# Patient Record
Sex: FEMALE | Race: WHITE | Employment: FULL TIME | URBAN - METROPOLITAN AREA
[De-identification: names, ages, dates, MRNs, and addresses within clinical notes are randomized per-mention and may not be internally consistent; named-entity substitution may affect disease eponyms.]

---

## 2017-01-04 RX ORDER — VENLAFAXINE HYDROCHLORIDE 150 MG/1
CAPSULE, EXTENDED RELEASE ORAL
Qty: 30 CAP | Refills: 0 | Status: SHIPPED | OUTPATIENT
Start: 2017-01-04 | End: 2017-02-17 | Stop reason: SDUPTHER

## 2017-02-17 RX ORDER — VENLAFAXINE HYDROCHLORIDE 150 MG/1
CAPSULE, EXTENDED RELEASE ORAL
Qty: 30 CAP | Refills: 0 | Status: SHIPPED | OUTPATIENT
Start: 2017-02-17 | End: 2019-11-14

## 2018-05-21 RX ORDER — ALBUTEROL SULFATE 90 UG/1
2 AEROSOL, METERED RESPIRATORY (INHALATION)
Qty: 1 INHALER | Refills: 0 | Status: SHIPPED | OUTPATIENT
Start: 2018-05-21 | End: 2021-05-02 | Stop reason: SDUPTHER

## 2018-05-21 NOTE — TELEPHONE ENCOUNTER
From: Vinh Bui  To: Harper Dill MD  Sent: 5/20/2018 10:14 AM EDT  Subject: Medication Renewal Request    Original authorizing provider: MD Preeti Mirza COLBYAna Alicia Orantes would like a refill of the following medications:  albuterol (PROVENTIL HFA, VENTOLIN HFA, PROAIR HFA) 90 mcg/actuation inhaler Harper Dill MD]    Preferred pharmacy: [facility]    Comment:  My allergies are really acting up and I would love to have this inhaler refilled, please! Thanks!

## 2018-05-21 NOTE — TELEPHONE ENCOUNTER
Orders Placed This Encounter    albuterol (PROVENTIL HFA, VENTOLIN HFA, PROAIR HFA) 90 mcg/actuation inhaler     Sig: Take 2 Puffs by inhalation every four (4) hours as needed for Wheezing or Shortness of Breath. Dispense:  1 Inhaler     Refill:  0     The above medication refills were approved via verbal order by Dr. Bradley Zhu III.

## 2019-11-14 ENCOUNTER — OFFICE VISIT (OUTPATIENT)
Dept: INTERNAL MEDICINE CLINIC | Age: 64
End: 2019-11-14

## 2019-11-14 VITALS
DIASTOLIC BLOOD PRESSURE: 84 MMHG | SYSTOLIC BLOOD PRESSURE: 134 MMHG | OXYGEN SATURATION: 98 % | HEART RATE: 79 BPM | TEMPERATURE: 97.8 F | RESPIRATION RATE: 20 BRPM | HEIGHT: 61 IN

## 2019-11-14 DIAGNOSIS — J32.4 CHRONIC PANSINUSITIS: Primary | ICD-10-CM

## 2019-11-14 DIAGNOSIS — J01.90 ACUTE BACTERIAL SINUSITIS: ICD-10-CM

## 2019-11-14 DIAGNOSIS — B96.89 ACUTE BACTERIAL SINUSITIS: ICD-10-CM

## 2019-11-14 RX ORDER — PREDNISONE 10 MG/1
TABLET ORAL
Qty: 30 TAB | Refills: 0 | Status: SHIPPED | OUTPATIENT
Start: 2019-11-14 | End: 2019-11-26

## 2019-11-14 RX ORDER — CEFUROXIME AXETIL 250 MG/1
250 TABLET ORAL 2 TIMES DAILY
Qty: 28 TAB | Refills: 0 | Status: SHIPPED | OUTPATIENT
Start: 2019-11-14 | End: 2019-11-28

## 2019-11-14 NOTE — PROGRESS NOTES
HISTORY OF PRESENT ILLNESS  Preeti Hardin is a 60 y.o. female. Sinus Infection    The history is provided by the patient. This is a chronic problem. Episode onset: flares periodically, current flare up over last several days. The problem has been gradually worsening. There has been no fever. The pain is moderate. The pain has been fluctuating since onset. Associated symptoms include congestion, sinus pressure and headaches. Pertinent negatives include no chills, no sweats and no cough. Treatments tried: mucinex D, zyrtec, flonase spray. The treatment provided mild relief. No past medical history on file. - no new problems reported    Past Surgical History:   Procedure Laterality Date    HX DILATION AND CURETTAGE      Several times for infertility    HX GYN      HX TONSILLECTOMY       Allergies   Allergen Reactions    Keflex [Cephalexin] Itching      - reviewed, itching only     Family History   Problem Relation Age of Onset    Heart Disease Mother     Lung Disease Mother         COPD    Heart Disease Father      Social History     Socioeconomic History    Marital status:      Spouse name: Not on file    Number of children: Not on file    Years of education: Not on file    Highest education level: Not on file   Occupational History    Not on file   Social Needs    Financial resource strain: Not on file    Food insecurity:     Worry: Not on file     Inability: Not on file    Transportation needs:     Medical: Not on file     Non-medical: Not on file   Tobacco Use    Smoking status: Never Smoker    Smokeless tobacco: Never Used   Substance and Sexual Activity    Alcohol use:  Yes     Alcohol/week: 1.7 standard drinks     Types: 2 drink(s) per week    Drug use: No    Sexual activity: Not on file   Lifestyle    Physical activity:     Days per week: Not on file     Minutes per session: Not on file    Stress: Not on file   Relationships    Social connections:     Talks on phone: Not on file     Gets together: Not on file     Attends Rastafarian service: Not on file     Active member of club or organization: Not on file     Attends meetings of clubs or organizations: Not on file     Relationship status: Not on file    Intimate partner violence:     Fear of current or ex partner: Not on file     Emotionally abused: Not on file     Physically abused: Not on file     Forced sexual activity: Not on file   Other Topics Concern    Not on file   Social History Narrative    Not on file         Review of Systems   Constitutional: Negative for chills. HENT: Positive for congestion, nosebleeds and sinus pressure. Respiratory: Negative for cough. Gastrointestinal: Negative for abdominal pain, diarrhea, nausea and vomiting. Neurological: Positive for headaches. All other systems reviewed and are negative. Physical Exam   Constitutional: No distress. HENT:   Right Ear: Tympanic membrane and ear canal normal.   Left Ear: Tympanic membrane and ear canal normal.   Nose: Right sinus exhibits no maxillary sinus tenderness and no frontal sinus tenderness. Left sinus exhibits maxillary sinus tenderness and frontal sinus tenderness. Mouth/Throat: Posterior oropharyngeal edema and posterior oropharyngeal erythema present. No oropharyngeal exudate. Eyes: Conjunctivae are normal. Right eye exhibits no discharge. Left eye exhibits no discharge. Neck: Neck supple. Cardiovascular: Normal rate and regular rhythm. Exam reveals no gallop and no friction rub. No murmur heard. Pulmonary/Chest: Effort normal and breath sounds normal.   Lymphadenopathy:     She has no cervical adenopathy. Neurological: She is alert. Skin: Skin is warm and dry. Psychiatric: She has a normal mood and affect. Her behavior is normal.   Nursing note and vitals reviewed. ASSESSMENT and PLAN  Diagnoses and all orders for this visit:    1. Chronic pansinusitis  -     cefUROXime (CEFTIN) 250 mg tablet;  Take 1 Tab by mouth two (2) times a day for 14 days. Indications: Acute Inflammation of the Maxillary Sinus  -     predniSONE (DELTASONE) 10 mg tablet; 4 tabs x 3 days, 3 tabs x 3 days, 2 tabs x 3 days, 1 tab x 3 days  -     REFERRAL TO ENT-OTOLARYNGOLOGY    2. Acute bacterial sinusitis  -     cefUROXime (CEFTIN) 250 mg tablet; Take 1 Tab by mouth two (2) times a day for 14 days.  Indications: Acute Inflammation of the Maxillary Sinus  -     predniSONE (DELTASONE) 10 mg tablet; 4 tabs x 3 days, 3 tabs x 3 days, 2 tabs x 3 days, 1 tab x 3 days  -     REFERRAL TO ENT-OTOLARYNGOLOGY

## 2019-11-14 NOTE — PROGRESS NOTES
Hx of sinus infection x1 year intermittently - per pt self dx. Has tried multiple otc meds with little relief.

## 2020-02-05 ENCOUNTER — OFFICE VISIT (OUTPATIENT)
Dept: INTERNAL MEDICINE CLINIC | Age: 65
End: 2020-02-05

## 2020-02-05 VITALS
HEART RATE: 80 BPM | HEIGHT: 61 IN | DIASTOLIC BLOOD PRESSURE: 80 MMHG | SYSTOLIC BLOOD PRESSURE: 126 MMHG | TEMPERATURE: 97.9 F | OXYGEN SATURATION: 97 % | RESPIRATION RATE: 14 BRPM

## 2020-02-05 DIAGNOSIS — Z00.00 ROUTINE GENERAL MEDICAL EXAMINATION AT A HEALTH CARE FACILITY: Primary | ICD-10-CM

## 2020-02-05 RX ORDER — MINERAL OIL
ENEMA (ML) RECTAL
COMMUNITY

## 2020-02-05 RX ORDER — SUMATRIPTAN 50 MG/1
TABLET, FILM COATED ORAL
COMMUNITY
Start: 2020-01-30 | End: 2020-05-15 | Stop reason: SDUPTHER

## 2020-02-05 RX ORDER — TOPIRAMATE 100 MG/1
TABLET, FILM COATED ORAL
COMMUNITY
Start: 2020-01-30 | End: 2020-05-08 | Stop reason: SDUPTHER

## 2020-02-05 RX ORDER — FLUTICASONE PROPIONATE 50 MCG
2 SPRAY, SUSPENSION (ML) NASAL DAILY
COMMUNITY

## 2020-02-05 NOTE — PROGRESS NOTES
Subjective:   63 y.o. female for Well Woman Check. Her gyne and breast care is done elsewhere by her Ob-Gyne physician. There are no active problems to display for this patient. Current Outpatient Medications   Medication Sig Dispense Refill    SUMAtriptan (IMITREX) 50 mg tablet TAKE 1 TABLET BY MOUTH AS NEEDED FOR MIGRAINE HEADACHE (TAKE IMMEDIATELY AT ONSET AND MAY REPEAT ONCE IN 2 HRS IF NEEDED)      topiramate (TOPAMAX) 100 mg tablet TAKE 1 TABLET BY MOUTH ONCE DAILY AT BEDTIME OR AS DIRECTED      fexofenadine (ALLEGRA) 180 mg tablet Take  by mouth.  fluticasone propionate (FLONASE ALLERGY RELIEF) 50 mcg/actuation nasal spray 2 Sprays by Both Nostrils route daily.  guaifenesin (MUCUS RELIEF PO) Take  by mouth.  varicella-zoster recombinant, PF, (SHINGRIX) 50 mcg/0.5 mL susr injection 0.5 mL by IntraMUSCular route once for 1 dose. 0.5 mL 1    albuterol (PROVENTIL HFA, VENTOLIN HFA, PROAIR HFA) 90 mcg/actuation inhaler Take 2 Puffs by inhalation every four (4) hours as needed for Wheezing or Shortness of Breath. 1 Inhaler 0     Allergies   Allergen Reactions    Keflex [Cephalexin] Itching     Pt denies this allergy - has taken w/ no problem. No past medical history on file. Past Surgical History:   Procedure Laterality Date    HX DILATION AND CURETTAGE      Several times for infertility    HX GYN      HX TONSILLECTOMY       Family History   Problem Relation Age of Onset    Heart Disease Mother     Lung Disease Mother         COPD    Alcohol abuse Mother     Heart Disease Father      Social History     Tobacco Use    Smoking status: Never Smoker    Smokeless tobacco: Never Used   Substance Use Topics    Alcohol use:  Yes     Alcohol/week: 1.7 standard drinks     Types: 2 Standard drinks or equivalent per week        Lab Results   Component Value Date/Time    WBC 6.6 06/16/2015 10:46 AM    HGB 12.9 06/16/2015 10:46 AM    HCT 39.8 06/16/2015 10:46 AM    PLATELET 916 06/16/2015 10:46 AM    MCV 87 06/16/2015 10:46 AM     Lab Results   Component Value Date/Time    Cholesterol, total 234 (H) 06/16/2015 10:46 AM    HDL Cholesterol 64 06/16/2015 10:46 AM    LDL, calculated 147 (H) 06/16/2015 10:46 AM    Triglyceride 113 06/16/2015 10:46 AM     Lab Results   Component Value Date/Time    ALT (SGPT) 16 06/16/2015 10:46 AM    AST (SGOT) 13 06/16/2015 10:46 AM    Alk. phosphatase 79 06/16/2015 10:46 AM    Bilirubin, total 0.4 06/16/2015 10:46 AM    Albumin 4.2 06/16/2015 10:46 AM    Protein, total 6.3 06/16/2015 10:46 AM    PLATELET 252 65/42/1124 10:46 AM     Lab Results   Component Value Date/Time    GFR est non-AA 84 06/16/2015 10:46 AM    GFR est AA 97 06/16/2015 10:46 AM    Creatinine 0.78 06/16/2015 10:46 AM    BUN 15 06/16/2015 10:46 AM    Sodium 141 06/16/2015 10:46 AM    Potassium 4.5 06/16/2015 10:46 AM    Chloride 103 06/16/2015 10:46 AM    CO2 19 06/16/2015 10:46 AM     Lab Results   Component Value Date/Time    TSH 0.042 (L) 06/16/2015 10:46 AM    T4, Free 1.02 06/16/2015 10:46 AM      Lab Results   Component Value Date/Time    Glucose 84 06/16/2015 10:46 AM         Specific concerns today: Continues to struggle with her weight. She has been trying to increase her exercise. She has not been watching her diet. She has recently gotten her colonoscopy done. She is also done her mammogram and. .  Recently diagnosed with migraine headaches. Topamax and Imitrex do seem to be helping. Review of Systems  A comprehensive review of systems was negative. Objective:   Blood pressure 126/80, pulse 80, temperature 97.9 °F (36.6 °C), temperature source Oral, resp. rate 14, height 5' 1\" (1.549 m), SpO2 97 %.    Physical Examination:   General appearance - alert, well appearing, and in no distress  Eyes - pupils equal and reactive, extraocular eye movements intact  Ears - bilateral TM's and external ear canals normal  Nose - normal and patent, no erythema, discharge or polyps  Mouth - mucous membranes moist, pharynx normal without lesions  Neck - supple, no significant adenopathy  Lymphatics - no palpable lymphadenopathy, no hepatosplenomegaly  Chest - clear to auscultation, no wheezes, rales or rhonchi, symmetric air entry  Heart - normal rate, regular rhythm, normal S1, S2, no murmurs, rubs, clicks or gallops  Abdomen - soft, nontender, nondistended, no masses or organomegaly  Neurological - alert, oriented, normal speech, no focal findings or movement disorder noted  Musculoskeletal - no joint tenderness, deformity or swelling  Extremities - peripheral pulses normal, no pedal edema, no clubbing or cyanosis     Assessment/Plan:     lose weight, increase physical activity, routine labs ordered  Diagnoses and all orders for this visit:    1. Routine general medical examination at a health care facility  -     METABOLIC PANEL, COMPREHENSIVE  -     CBC WITH AUTOMATED DIFF  -     LIPID PANEL  -     TSH RFX ON ABNORMAL TO FREE T4  -     VITAMIN D, 25 HYDROXY    Other orders  -     varicella-zoster recombinant, PF, (SHINGRIX) 50 mcg/0.5 mL susr injection; 0.5 mL by IntraMUSCular route once for 1 dose. 2.  Migraine headaches-continue follow-up with ENT as planned and working on adjusting medications.

## 2020-05-07 ENCOUNTER — PATIENT MESSAGE (OUTPATIENT)
Dept: INTERNAL MEDICINE CLINIC | Age: 65
End: 2020-05-07

## 2020-05-08 RX ORDER — TOPIRAMATE 100 MG/1
TABLET, FILM COATED ORAL
Qty: 90 TAB | Refills: 1 | Status: SHIPPED | OUTPATIENT
Start: 2020-05-08 | End: 2020-11-03 | Stop reason: SDUPTHER

## 2020-05-08 NOTE — TELEPHONE ENCOUNTER
From: Katelin Bui  To: Bernie Rodriguez MD  Sent: 5/7/2020 9:55 AM EDT  Subject: Non-Urgent Jose Reilly, Dr. Pamela Forde! I would like for you to take over the handling of my migraines and all that entails including my medicines. I believe Nickie Gold sent you all my records since I asked him to do so when I initially saw him in Dec. '19. When I came for my physical in Feb. of this year, we discussed all of this and you have the name and dosage of the current meds. Is there anything else I need to do about this? Thanks so much and I hope you are staying healthy!   Preeti

## 2020-05-15 RX ORDER — SUMATRIPTAN 50 MG/1
TABLET, FILM COATED ORAL
Qty: 12 TAB | Refills: 5 | Status: SHIPPED | OUTPATIENT
Start: 2020-05-15 | End: 2021-07-29

## 2020-05-15 NOTE — TELEPHONE ENCOUNTER
Requested Prescriptions     Pending Prescriptions Disp Refills    SUMAtriptan (IMITREX) 50 mg tablet       [address], [place] [place] [address]

## 2020-11-04 RX ORDER — TOPIRAMATE 100 MG/1
TABLET, FILM COATED ORAL
Qty: 90 TAB | Refills: 1 | Status: SHIPPED | OUTPATIENT
Start: 2020-11-04 | End: 2021-05-02 | Stop reason: SDUPTHER

## 2021-05-03 RX ORDER — ALBUTEROL SULFATE 90 UG/1
2 AEROSOL, METERED RESPIRATORY (INHALATION)
Qty: 1 INHALER | Refills: 0 | Status: SHIPPED | OUTPATIENT
Start: 2021-05-03 | End: 2022-07-27 | Stop reason: SDUPTHER

## 2021-05-03 RX ORDER — TOPIRAMATE 100 MG/1
TABLET, FILM COATED ORAL
Qty: 90 TAB | Refills: 1 | Status: SHIPPED | OUTPATIENT
Start: 2021-05-03 | End: 2021-10-28

## 2021-07-29 RX ORDER — SUMATRIPTAN 50 MG/1
TABLET, FILM COATED ORAL
Qty: 12 TABLET | Refills: 0 | Status: SHIPPED | OUTPATIENT
Start: 2021-07-29 | End: 2021-12-02

## 2021-10-28 RX ORDER — TOPIRAMATE 100 MG/1
TABLET, FILM COATED ORAL
Qty: 90 TABLET | Refills: 0 | Status: SHIPPED | OUTPATIENT
Start: 2021-10-28 | End: 2022-01-26

## 2021-12-02 RX ORDER — SUMATRIPTAN 50 MG/1
TABLET, FILM COATED ORAL
Qty: 12 TABLET | Refills: 0 | Status: SHIPPED | OUTPATIENT
Start: 2021-12-02 | End: 2022-07-27 | Stop reason: SDUPTHER

## 2022-01-26 RX ORDER — TOPIRAMATE 100 MG/1
TABLET, FILM COATED ORAL
Qty: 90 TABLET | Refills: 0 | Status: SHIPPED | OUTPATIENT
Start: 2022-01-26 | End: 2022-05-01

## 2022-02-18 ENCOUNTER — OFFICE VISIT (OUTPATIENT)
Dept: INTERNAL MEDICINE CLINIC | Age: 67
End: 2022-02-18
Payer: COMMERCIAL

## 2022-02-18 VITALS
HEART RATE: 87 BPM | SYSTOLIC BLOOD PRESSURE: 98 MMHG | DIASTOLIC BLOOD PRESSURE: 63 MMHG | OXYGEN SATURATION: 100 % | RESPIRATION RATE: 16 BRPM | TEMPERATURE: 98 F | HEIGHT: 62 IN

## 2022-02-18 DIAGNOSIS — Z23 ENCOUNTER FOR IMMUNIZATION: ICD-10-CM

## 2022-02-18 DIAGNOSIS — Z00.00 ROUTINE GENERAL MEDICAL EXAMINATION AT A HEALTH CARE FACILITY: Primary | ICD-10-CM

## 2022-02-18 DIAGNOSIS — Z00.00 WELL WOMAN EXAM (NO GYNECOLOGICAL EXAM): ICD-10-CM

## 2022-02-18 DIAGNOSIS — Z12.31 SCREENING MAMMOGRAM FOR BREAST CANCER: ICD-10-CM

## 2022-02-18 DIAGNOSIS — F98.8 ATTENTION DEFICIT DISORDER (ADD) WITHOUT HYPERACTIVITY: ICD-10-CM

## 2022-02-18 PROCEDURE — 99397 PER PM REEVAL EST PAT 65+ YR: CPT | Performed by: INTERNAL MEDICINE

## 2022-02-18 PROCEDURE — 90732 PPSV23 VACC 2 YRS+ SUBQ/IM: CPT | Performed by: INTERNAL MEDICINE

## 2022-02-18 PROCEDURE — 93000 ELECTROCARDIOGRAM COMPLETE: CPT | Performed by: INTERNAL MEDICINE

## 2022-02-18 PROCEDURE — 90471 IMMUNIZATION ADMIN: CPT | Performed by: INTERNAL MEDICINE

## 2022-02-18 RX ORDER — METHYLPHENIDATE HYDROCHLORIDE 10 MG/1
10 TABLET ORAL
Qty: 60 TABLET | Refills: 0 | Status: SHIPPED | OUTPATIENT
Start: 2022-02-18 | End: 2022-03-30 | Stop reason: DRUGHIGH

## 2022-02-18 RX ORDER — CETIRIZINE HCL 10 MG
10 TABLET ORAL DAILY
COMMUNITY

## 2022-02-18 NOTE — PROGRESS NOTES
Subjective:   74 y.o. female for Well Woman Check. Her gyne and breast care is done elsewhere by her Ob-Gyne physician. There are no problems to display for this patient. Current Outpatient Medications   Medication Sig Dispense Refill    cetirizine (ZYRTEC) 10 mg tablet Take 10 mg by mouth daily.  methylphenidate HCl (RITALIN) 10 mg tablet Take 1 Tablet by mouth two (2) times daily as needed (add). Max Daily Amount: 20 mg. 60 Tablet 0    topiramate (TOPAMAX) 100 mg tablet TAKE 1 TABLET BY MOUTH ONCE DAILY AT BEDTIME OR AS DIRECTED PATIENT MUST MAKE AN APPOINTMENT FOR MORE REFILLS 90 Tablet 0    SUMAtriptan (IMITREX) 50 mg tablet TAKE 1 TABLET BY MOUTH AS NEEDED FOR MIGRAINE HEADACHE (TAKE IMMEDIATELY AT ONSET AND MAY REPEAT ONCE IN 2 HOURS IF NEEDED) 12 Tablet 0    albuterol (PROVENTIL HFA, VENTOLIN HFA, PROAIR HFA) 90 mcg/actuation inhaler Take 2 Puffs by inhalation every four (4) hours as needed for Wheezing or Shortness of Breath. 1 Inhaler 0    fexofenadine (ALLEGRA) 180 mg tablet Take  by mouth.  fluticasone propionate (FLONASE ALLERGY RELIEF) 50 mcg/actuation nasal spray 2 Sprays by Both Nostrils route daily.  guaifenesin (MUCUS RELIEF PO) Take  by mouth. Allergies   Allergen Reactions    Keflex [Cephalexin] Itching     Pt denies this allergy - has taken w/ no problem. History reviewed. No pertinent past medical history.   Past Surgical History:   Procedure Laterality Date    HX DILATION AND CURETTAGE      Several times for infertility    HX GYN      HX TONSILLECTOMY       Family History   Problem Relation Age of Onset    Heart Disease Mother     Lung Disease Mother         COPD    Alcohol abuse Mother     Heart Disease Father         Lab Results   Component Value Date/Time    WBC 6.6 06/16/2015 10:46 AM    HGB 12.9 06/16/2015 10:46 AM    HCT 39.8 06/16/2015 10:46 AM    PLATELET 818 48/93/2227 10:46 AM    MCV 87 06/16/2015 10:46 AM     Lab Results   Component Value Date/Time    Cholesterol, total 234 (H) 06/16/2015 10:46 AM    HDL Cholesterol 64 06/16/2015 10:46 AM    LDL, calculated 147 (H) 06/16/2015 10:46 AM    Triglyceride 113 06/16/2015 10:46 AM     Lab Results   Component Value Date/Time    ALT (SGPT) 16 06/16/2015 10:46 AM    Alk. phosphatase 79 06/16/2015 10:46 AM    Bilirubin, total 0.4 06/16/2015 10:46 AM    Albumin 4.2 06/16/2015 10:46 AM    Protein, total 6.3 06/16/2015 10:46 AM    PLATELET 973 16/92/7670 10:46 AM     Lab Results   Component Value Date/Time    GFR est non-AA 84 06/16/2015 10:46 AM    GFR est AA 97 06/16/2015 10:46 AM    Creatinine 0.78 06/16/2015 10:46 AM    BUN 15 06/16/2015 10:46 AM    Sodium 141 06/16/2015 10:46 AM    Potassium 4.5 06/16/2015 10:46 AM    Chloride 103 06/16/2015 10:46 AM    CO2 19 06/16/2015 10:46 AM     Lab Results   Component Value Date/Time    TSH 0.042 (L) 06/16/2015 10:46 AM    T4, Free 1.02 06/16/2015 10:46 AM      Lab Results   Component Value Date/Time    Glucose 84 06/16/2015 10:46 AM         Specific concerns today: ADD issues - both kids have it and she is struggling with work. .    Review of Systems  A comprehensive review of systems was negative except for that written in the HPI. Objective:   Blood pressure 98/63, pulse 87, temperature 98 °F (36.7 °C), temperature source Temporal, resp. rate 16, height 5' 1.5\" (1.562 m), SpO2 100 %.    Physical Examination:   General appearance - alert, well appearing, and in no distress  Ears - bilateral TM's and external ear canals normal  Mouth - mucous membranes moist, pharynx normal without lesions  Neck - supple, no significant adenopathy  Lymphatics - no palpable lymphadenopathy, no hepatosplenomegaly  Chest - clear to auscultation, no wheezes, rales or rhonchi, symmetric air entry  Heart - normal rate, regular rhythm, normal S1, S2, no murmurs, rubs, clicks or gallops  Abdomen - soft, nontender, nondistended, no masses or organomegaly  Neurological - alert, oriented, normal speech, no focal findings or movement disorder noted, motor and sensory grossly normal bilaterally  Musculoskeletal - no joint tenderness, deformity or swelling  Extremities - peripheral pulses normal, no pedal edema, no clubbing or cyanosis     Assessment/Plan:     lose weight, increase physical activity, routine labs ordered  Diagnoses and all orders for this visit:    1. Routine general medical examination at a health care facility  -     AMB POC EKG ROUTINE W/ 12 LEADS, INTER & REP  -     CBC WITH AUTOMATED DIFF; Future  -     LIPID PANEL; Future  -     METABOLIC PANEL, COMPREHENSIVE; Future  -     TSH 3RD GENERATION; Future  -     VITAMIN D, 25 HYDROXY; Future    2. Screening mammogram for breast cancer  -     [facility] MAMMO BI SCREENING INCL CAD; Future    3. Encounter for immunization  -     PNEUMOCOCCAL POLYSACCHARIDE VACCINE, 23-VALENT, ADULT OR IMMUNOSUPPRESSED PT DOSE,    4. Attention deficit disorder (ADD) without hyperactivity - will treat with ADD meds and she will let me know how it goes. -     methylphenidate HCl (RITALIN) 10 mg tablet; Take 1 Tablet by mouth two (2) times daily as needed (add).  Max Daily Amount: 20 mg.    5. Well woman exam (no gynecological exam)  Comments:  [V70.0]

## 2022-02-18 NOTE — PROGRESS NOTES
Jessica Tubbs  is a 74 y.o.  female  who present for routine immunizations. Prior to vaccine administration: Consent was obtained. Risks and adverse reactions were discussed. The patient was provided the VIS and they were given an opportunity to ask questions; all questions were addressed. She  denies any symptoms, reactions or allergies that would exclude them from being immunized today. There were no adverse reactions observed post vaccination. Patient was advised to seek medical or call the office with any questions or concerns post vaccination. Patient verbalized understanding.   Bridger Grossman

## 2022-02-18 NOTE — PROGRESS NOTES
Chief Complaint   Patient presents with    Complete Physical       1. Have you been to the ER, urgent care clinic since your last visit? Hospitalized since your last visit? No    2. Have you seen or consulted any other health care providers outside of the [address] [place] [address] since your last visit? Include any pap smears or colon screening.  No

## 2022-03-28 ENCOUNTER — PATIENT MESSAGE (OUTPATIENT)
Dept: INTERNAL MEDICINE CLINIC | Age: 67
End: 2022-03-28

## 2022-03-28 DIAGNOSIS — F98.8 ATTENTION DEFICIT DISORDER (ADD) WITHOUT HYPERACTIVITY: Primary | ICD-10-CM

## 2022-03-30 RX ORDER — METHYLPHENIDATE HYDROCHLORIDE 20 MG/1
20 TABLET ORAL 2 TIMES DAILY
Qty: 60 TABLET | Refills: 0 | Status: SHIPPED | OUTPATIENT
Start: 2022-03-30 | End: 2022-04-18 | Stop reason: SDUPTHER

## 2022-04-18 DIAGNOSIS — F98.8 ATTENTION DEFICIT DISORDER (ADD) WITHOUT HYPERACTIVITY: ICD-10-CM

## 2022-04-18 RX ORDER — METHYLPHENIDATE HYDROCHLORIDE 20 MG/1
20 TABLET ORAL 2 TIMES DAILY
Qty: 60 TABLET | Refills: 0 | Status: SHIPPED | OUTPATIENT
Start: 2022-04-18 | End: 2022-07-08 | Stop reason: SDUPTHER

## 2022-05-01 RX ORDER — TOPIRAMATE 100 MG/1
TABLET, FILM COATED ORAL
Qty: 90 TABLET | Refills: 0 | Status: SHIPPED | OUTPATIENT
Start: 2022-05-01 | End: 2022-07-27 | Stop reason: SDUPTHER

## 2022-05-31 ENCOUNTER — HOSPITAL ENCOUNTER (OUTPATIENT)
Dept: MAMMOGRAPHY | Age: 67
Discharge: HOME | End: 2022-05-31
Attending: INTERNAL MEDICINE
Payer: COMMERCIAL

## 2022-05-31 DIAGNOSIS — Z12.31 SCREENING MAMMOGRAM FOR BREAST CANCER: ICD-10-CM

## 2022-05-31 PROCEDURE — 77067 SCR MAMMO BI INCL CAD: CPT

## 2022-06-01 ENCOUNTER — TELEPHONE (OUTPATIENT)
Dept: INTERNAL MEDICINE CLINIC | Age: 67
End: 2022-06-01

## 2022-06-01 NOTE — TELEPHONE ENCOUNTER
Returned call to patient. States she has cough, sore throat, and is hoarse. Asked if she could be seen in person for evaluation. Notified of current COVID protocol and need for VV. Patient states she did a VV over the weekend online and is no better. Currently taking mucinex, vit  C, robitussin, and tessalon - not feeling better. Denies fever, denies SOB. She has not been COVID tested. Reviewed with PCP and notified patient that he recommends she be seen in Urgent  Care for COVID testing, someone can listen to lungs, etc.  May need CXR. Patient voiced understanding.

## 2022-06-01 NOTE — TELEPHONE ENCOUNTER
Patient called to schedule an appointment -- has laryngitis, congestion, etc.  I told her we could schedule a virtual appt but she was not satisfied with that. Wants to \"speak with Tree or the nurse\".

## 2022-07-08 DIAGNOSIS — F98.8 ATTENTION DEFICIT DISORDER (ADD) WITHOUT HYPERACTIVITY: ICD-10-CM

## 2022-07-08 RX ORDER — METHYLPHENIDATE HYDROCHLORIDE 20 MG/1
20 TABLET ORAL 2 TIMES DAILY
Qty: 60 TABLET | Refills: 0 | Status: SHIPPED | OUTPATIENT
Start: 2022-07-08 | End: 2022-10-20 | Stop reason: SDUPTHER

## 2022-07-08 NOTE — TELEPHONE ENCOUNTER
Chief Complaint   Patient presents with    Medication Refill     Last Appointment with Dr. Rosemarie Vargas:  2/18/2022  No future appointments.

## 2022-07-27 RX ORDER — TOPIRAMATE 100 MG/1
TABLET, FILM COATED ORAL
Qty: 90 TABLET | Refills: 0 | Status: SHIPPED | OUTPATIENT
Start: 2022-07-27 | End: 2022-10-20 | Stop reason: SDUPTHER

## 2022-07-27 RX ORDER — ALBUTEROL SULFATE 90 UG/1
2 AEROSOL, METERED RESPIRATORY (INHALATION)
Qty: 1 EACH | Refills: 0 | Status: SHIPPED | OUTPATIENT
Start: 2022-07-27

## 2022-07-27 RX ORDER — SUMATRIPTAN 50 MG/1
50 TABLET, FILM COATED ORAL
Qty: 12 TABLET | Refills: 3 | Status: SHIPPED | OUTPATIENT
Start: 2022-07-27 | End: 2022-07-27

## 2022-07-27 NOTE — TELEPHONE ENCOUNTER
Chief Complaint   Patient presents with    Medication Refill     Last Appointment with Dr. Saleem :  2/18/2022  No future appointments.

## 2022-10-20 DIAGNOSIS — F98.8 ATTENTION DEFICIT DISORDER (ADD) WITHOUT HYPERACTIVITY: ICD-10-CM

## 2022-10-20 RX ORDER — TOPIRAMATE 100 MG/1
TABLET, FILM COATED ORAL
Qty: 90 TABLET | Refills: 0 | Status: SHIPPED | OUTPATIENT
Start: 2022-10-20

## 2022-10-20 RX ORDER — METHYLPHENIDATE HYDROCHLORIDE 20 MG/1
20 TABLET ORAL 2 TIMES DAILY
Qty: 60 TABLET | Refills: 0 | Status: CANCELLED | OUTPATIENT
Start: 2022-10-20

## 2022-10-20 RX ORDER — METHYLPHENIDATE HYDROCHLORIDE 20 MG/1
20 TABLET ORAL 2 TIMES DAILY
Qty: 60 TABLET | Refills: 0 | Status: SHIPPED | OUTPATIENT
Start: 2022-10-20

## 2022-10-20 NOTE — TELEPHONE ENCOUNTER
Chief Complaint   Patient presents with    Medication Refill     Last Appointment with Dr. Quinn Friend:  2/18/2022  No future appointments.

## 2023-01-26 DIAGNOSIS — F98.8 ATTENTION DEFICIT DISORDER (ADD) WITHOUT HYPERACTIVITY: ICD-10-CM

## 2023-02-02 RX ORDER — METHYLPHENIDATE HYDROCHLORIDE 20 MG/1
20 TABLET ORAL 2 TIMES DAILY
Qty: 60 TABLET | Refills: 0 | Status: SHIPPED | OUTPATIENT
Start: 2023-02-02

## 2023-02-06 RX ORDER — TOPIRAMATE 100 MG/1
TABLET, FILM COATED ORAL
Qty: 90 TABLET | Refills: 0 | Status: SHIPPED | OUTPATIENT
Start: 2023-02-06

## 2023-02-06 NOTE — TELEPHONE ENCOUNTER
Chief Complaint   Patient presents with    Medication Refill     Last Appointment with Dr. Varghese Primer:  2/18/2022  Future Appointments   Date Time Provider Brice Zheng   4/27/2023  4:00 PM Michele Funez MD [facility]

## 2023-04-27 ENCOUNTER — OFFICE VISIT (OUTPATIENT)
Dept: INTERNAL MEDICINE CLINIC | Age: 68
End: 2023-04-27
Payer: COMMERCIAL

## 2023-04-27 VITALS
OXYGEN SATURATION: 97 % | HEART RATE: 79 BPM | SYSTOLIC BLOOD PRESSURE: 119 MMHG | TEMPERATURE: 97.8 F | DIASTOLIC BLOOD PRESSURE: 83 MMHG | RESPIRATION RATE: 18 BRPM

## 2023-04-27 DIAGNOSIS — F98.8 ATTENTION DEFICIT DISORDER (ADD) WITHOUT HYPERACTIVITY: ICD-10-CM

## 2023-04-27 DIAGNOSIS — G43.809 OTHER MIGRAINE WITHOUT STATUS MIGRAINOSUS, NOT INTRACTABLE: ICD-10-CM

## 2023-04-27 DIAGNOSIS — J30.1 NON-SEASONAL ALLERGIC RHINITIS DUE TO POLLEN: ICD-10-CM

## 2023-04-27 DIAGNOSIS — Z00.00 ROUTINE GENERAL MEDICAL EXAMINATION AT A HEALTH CARE FACILITY: Primary | ICD-10-CM

## 2023-04-27 DIAGNOSIS — Z12.31 SCREENING MAMMOGRAM FOR BREAST CANCER: ICD-10-CM

## 2023-04-27 PROCEDURE — 99397 PER PM REEVAL EST PAT 65+ YR: CPT | Performed by: INTERNAL MEDICINE

## 2023-04-27 RX ORDER — SUMATRIPTAN 100 MG/1
TABLET, FILM COATED ORAL
COMMUNITY

## 2023-04-27 NOTE — PROGRESS NOTES
Chief Complaint   Patient presents with    Physical     Blood pressure 119/83, pulse 79, temperature 97.8 °F (36.6 °C), temperature source Temporal, resp. rate 18, SpO2 97 %.

## 2023-04-27 NOTE — PROGRESS NOTES
Subjective:   79 y.o. female for Well Woman Check. Her gyne and breast care is done elsewhere by her Ob-Gyne physician. There are no problems to display for this patient. Current Outpatient Medications   Medication Sig Dispense Refill    SUMAtriptan (IMITREX) 100 mg tablet sumatriptan      pneumococcal 20-brandi conj-dip, PF, (PREVNAR 20) 0.5 mL syrg injection 0.5 mL by IntraMUSCular route PRIOR TO DISCHARGE for 1 dose. 1 Each 0    topiramate (TOPAMAX) 100 mg tablet TAKE 1 TABLET BY MOUTH ONCE DAILY AT BEDTIME OR AS DIRECTED 90 Tablet 0    methylphenidate HCl (RITALIN) 20 mg tablet Take 1 Tablet by mouth two (2) times a day. Max Daily Amount: 40 mg. 60 Tablet 0    albuterol (PROVENTIL HFA, VENTOLIN HFA, PROAIR HFA) 90 mcg/actuation inhaler Take 2 Puffs by inhalation every four (4) hours as needed for Wheezing or Shortness of Breath. 1 Each 0    cetirizine (ZYRTEC) 10 mg tablet Take 1 Tablet by mouth daily. fluticasone propionate (FLONASE) 50 mcg/actuation nasal spray 2 Sprays by Both Nostrils route daily. guaifenesin (MUCUS RELIEF PO) Take  by mouth. Allergies   Allergen Reactions    Keflex [Cephalexin] Itching     Pt denies this allergy - has taken w/ no problem. History reviewed. No pertinent past medical history. Past Surgical History:   Procedure Laterality Date    HX DILATION AND CURETTAGE      Several times for infertility    HX GYN      HX TONSILLECTOMY       Family History   Problem Relation Age of Onset    Heart Disease Mother     Lung Disease Mother         COPD    Alcohol abuse Mother     Heart Disease Father     Breast Cancer Maternal Grandmother 76     Social History     Tobacco Use    Smoking status: Never    Smokeless tobacco: Never   Substance Use Topics    Alcohol use:  Yes     Alcohol/week: 1.0 standard drink     Types: 1 Standard drinks or equivalent per week        Lab Results   Component Value Date/Time    WBC 6.0 03/07/2022 08:33 AM    HGB 12.5 03/07/2022 08:33 AM HCT 39.6 03/07/2022 08:33 AM    PLATELET 647 97/32/0076 08:33 AM    MCV 89.0 03/07/2022 08:33 AM     Lab Results   Component Value Date/Time    Glucose 89 03/07/2022 08:33 AM    LDL, calculated 144.6 (H) 03/07/2022 08:33 AM    Creatinine 0.98 03/07/2022 08:33 AM      Lab Results   Component Value Date/Time    Cholesterol, total 230 (H) 03/07/2022 08:33 AM    HDL Cholesterol 63 03/07/2022 08:33 AM    LDL, calculated 144.6 (H) 03/07/2022 08:33 AM    Triglyceride 112 03/07/2022 08:33 AM    CHOL/HDL Ratio 3.7 03/07/2022 08:33 AM     Lab Results   Component Value Date/Time    ALT (SGPT) 18 03/07/2022 08:33 AM    Alk. phosphatase 72 03/07/2022 08:33 AM    Bilirubin, total 0.4 03/07/2022 08:33 AM    Albumin 3.8 03/07/2022 08:33 AM    Protein, total 6.8 03/07/2022 08:33 AM    PLATELET 116 49/39/8020 08:33 AM       Lab Results   Component Value Date/Time    GFR est non-AA 57 (L) 03/07/2022 08:33 AM    GFR est AA >60 03/07/2022 08:33 AM    Creatinine 0.98 03/07/2022 08:33 AM    BUN 16 03/07/2022 08:33 AM    Sodium 140 03/07/2022 08:33 AM    Potassium 4.0 03/07/2022 08:33 AM    Chloride 112 (H) 03/07/2022 08:33 AM    CO2 26 03/07/2022 08:33 AM     Lab Results   Component Value Date/Time    TSH 1.94 03/07/2022 08:33 AM    T4, Free 1.02 06/16/2015 10:46 AM      Lab Results   Component Value Date/Time    Glucose 89 03/07/2022 08:33 AM         Specific concerns today: Ongoing issues with allergy symptoms. She has had stuffy nose as well as some itchy eyes and irritation. Migraine headaches are reasonably controlled. Sumatriptan hand does help when she takes it and that is infrequent. .    Review of Systems  A comprehensive review of systems was negative except for that written in the HPI. Objective:   Blood pressure 119/83, pulse 79, temperature 97.8 °F (36.6 °C), temperature source Temporal, resp. rate 18, SpO2 97 %.    Physical Examination:   General appearance - alert, well appearing, and in no distress  Mental status - alert, oriented to person, place, and time  Ears - bilateral TM's and external ear canals normal  Mouth - mucous membranes moist, pharynx normal without lesions  Neck - supple, no significant adenopathy  Lymphatics - no palpable lymphadenopathy, no hepatosplenomegaly  Chest - clear to auscultation, no wheezes, rales or rhonchi, symmetric air entry  Heart - normal rate, regular rhythm, normal S1, S2, no murmurs, rubs, clicks or gallops  Abdomen - soft, nontender, nondistended, no masses or organomegaly  Neurological - alert, oriented, normal speech, no focal findings or movement disorder noted  Musculoskeletal - no joint tenderness, deformity or swelling  Extremities - peripheral pulses normal, no pedal edema, no clubbing or cyanosis     Assessment/Plan:     lose weight, increase physical activity, routine labs ordered  Diagnoses and all orders for this visit:    1. Routine general medical examination at a health care facility  -     METABOLIC PANEL, COMPREHENSIVE; Future  -     CBC WITH AUTOMATED DIFF; Future  -     LIPID PANEL; Future  -     TSH 3RD GENERATION; Future  -     VITAMIN D, 25 HYDROXY; Future    2. Attention deficit disorder (ADD) without hyperactivity-well-controlled on Ritalin. 3. Non-seasonal allergic rhinitis due to pollen-has not been using her Flonase regularly. We will restart that and continue Zyrtec and let me know if not improving. 4. Other migraine without status migrainosus, not intractable-stable on current meds and preventatives. 5. Screening mammogram for breast cancer  -     [facility] 3D MARIANO W MAMMO BI SCREENING INCL CAD; Future    Other orders  -     pneumococcal 20-brandi conj-dip, PF, (PREVNAR 20) 0.5 mL syrg injection; 0.5 mL by IntraMUSCular route PRIOR TO DISCHARGE for 1 dose.

## 2023-05-03 RX ORDER — TOPIRAMATE 100 MG/1
TABLET, FILM COATED ORAL
Qty: 90 TABLET | Refills: 0 | Status: SHIPPED | OUTPATIENT
Start: 2023-05-03

## 2023-05-17 ENCOUNTER — TRANSCRIBE ORDERS (OUTPATIENT)
Facility: HOSPITAL | Age: 68
End: 2023-05-17

## 2023-05-17 DIAGNOSIS — Z12.31 ENCOUNTER FOR MAMMOGRAM TO ESTABLISH BASELINE MAMMOGRAM: Primary | ICD-10-CM

## 2023-07-06 ENCOUNTER — PATIENT MESSAGE (OUTPATIENT)
Age: 68
End: 2023-07-06

## 2023-07-06 DIAGNOSIS — F98.8 OTHER SPECIFIED BEHAVIORAL AND EMOTIONAL DISORDERS WITH ONSET USUALLY OCCURRING IN CHILDHOOD AND ADOLESCENCE: Primary | ICD-10-CM

## 2023-07-06 RX ORDER — METHYLPHENIDATE HYDROCHLORIDE 20 MG/1
20 TABLET ORAL 2 TIMES DAILY
Qty: 60 TABLET | Refills: 0 | Status: SHIPPED | OUTPATIENT
Start: 2023-07-06 | End: 2023-08-05

## 2023-07-06 NOTE — TELEPHONE ENCOUNTER
From: Darlene Funes  To: Dr. Adama Jones: 7/6/2023 9:28 AM EDT  Subject: Ritalin    Good morning! My Chart will not let me request a refill and neither will Ignacia Finch do you mind calling it in for me? Thanks so much!   Gladys

## 2023-07-23 RX ORDER — TOPIRAMATE 100 MG/1
TABLET, FILM COATED ORAL
Qty: 90 TABLET | Refills: 0 | Status: SHIPPED | OUTPATIENT
Start: 2023-07-23

## 2023-07-24 ENCOUNTER — TELEPHONE (OUTPATIENT)
Age: 68
End: 2023-07-24

## 2023-07-24 NOTE — TELEPHONE ENCOUNTER
Medication Refill Request    Gladys Peoples is requesting a refill of the following medication(s):   topiramate (TOPAMAX) 100 MG tablet                  Please send refill to:      [address] [phone] - F [phone]  [address] [place] [address]  Phone: [phone] Fax: [phone]

## 2023-07-25 ENCOUNTER — HOSPITAL ENCOUNTER (OUTPATIENT)
Age: 68
Discharge: HOME | End: 2023-07-28
Payer: COMMERCIAL

## 2023-07-25 DIAGNOSIS — Z12.31 ENCOUNTER FOR MAMMOGRAM TO ESTABLISH BASELINE MAMMOGRAM: ICD-10-CM

## 2023-07-25 PROCEDURE — 77063 BREAST TOMOSYNTHESIS BI: CPT

## 2023-07-27 ENCOUNTER — PATIENT MESSAGE (OUTPATIENT)
Age: 68
End: 2023-07-27

## 2023-07-28 NOTE — TELEPHONE ENCOUNTER
From: Ajay Truong  To: Dr. Abdul Noss: 7/27/2023 12:55 PM EDT  Subject: Daughter Patricia [place]! Judy Dobson has had no luck getting an appointment with [facility].  What do you suggest?   Thanks,   Gladys

## 2023-08-01 ENCOUNTER — TELEPHONE (OUTPATIENT)
Age: 68
End: 2023-08-01

## 2023-08-01 RX ORDER — SUMATRIPTAN 100 MG/1
100 TABLET, FILM COATED ORAL DAILY PRN
Qty: 9 TABLET | Refills: 3 | Status: SHIPPED | OUTPATIENT
Start: 2023-08-01

## 2023-08-01 NOTE — TELEPHONE ENCOUNTER
Medication Refill Request    Gladys Sepulveda is requesting a refill of the following medication(s):   Sumatriptan 500mg  Please send refill to:      [address], [address] [place] [address] Drive [phone] - f [phone]  [address] [place] [address]  Phone: [phone] Fax: [phone]

## 2023-08-04 NOTE — TELEPHONE ENCOUNTER
Attempted to contact Gladys and [employer]. LMTCB to schedule NP appt with [facility]. Also sent [facility] with no response. Sally Abdi has scheduled a new pt appt with another practice in October and cannot get in touch with to set up with [facility].

## 2023-08-11 DIAGNOSIS — F98.8 OTHER SPECIFIED BEHAVIORAL AND EMOTIONAL DISORDERS WITH ONSET USUALLY OCCURRING IN CHILDHOOD AND ADOLESCENCE: Primary | ICD-10-CM

## 2023-08-11 RX ORDER — METHYLPHENIDATE HYDROCHLORIDE 20 MG/1
20 TABLET ORAL 2 TIMES DAILY
Qty: 60 TABLET | Refills: 0 | Status: SHIPPED | OUTPATIENT
Start: 2023-08-11 | End: 2023-09-10

## 2023-11-06 ENCOUNTER — PATIENT MESSAGE (OUTPATIENT)
Age: 68
End: 2023-11-06

## 2023-11-07 RX ORDER — TOPIRAMATE 100 MG/1
100 TABLET, FILM COATED ORAL DAILY
Qty: 90 TABLET | Refills: 1 | Status: SHIPPED | OUTPATIENT
Start: 2023-11-07

## 2023-11-07 RX ORDER — RIMEGEPANT SULFATE 75 MG/75MG
75 TABLET, ORALLY DISINTEGRATING ORAL
Qty: 2 TABLET | Refills: 0 | Status: SHIPPED | COMMUNITY
Start: 2023-11-07

## 2024-02-29 ENCOUNTER — OFFICE VISIT (OUTPATIENT)
Age: 69
End: 2024-02-29
Payer: COMMERCIAL

## 2024-02-29 VITALS
OXYGEN SATURATION: 97 % | RESPIRATION RATE: 16 BRPM | HEART RATE: 83 BPM | SYSTOLIC BLOOD PRESSURE: 140 MMHG | TEMPERATURE: 97.8 F | HEIGHT: 61 IN | DIASTOLIC BLOOD PRESSURE: 86 MMHG

## 2024-02-29 DIAGNOSIS — F98.8 OTHER SPECIFIED BEHAVIORAL AND EMOTIONAL DISORDERS WITH ONSET USUALLY OCCURRING IN CHILDHOOD AND ADOLESCENCE: ICD-10-CM

## 2024-02-29 DIAGNOSIS — G43.809 OTHER MIGRAINE, NOT INTRACTABLE, WITHOUT STATUS MIGRAINOSUS: ICD-10-CM

## 2024-02-29 DIAGNOSIS — K21.9 GASTROESOPHAGEAL REFLUX DISEASE, UNSPECIFIED WHETHER ESOPHAGITIS PRESENT: Primary | ICD-10-CM

## 2024-02-29 PROCEDURE — 1123F ACP DISCUSS/DSCN MKR DOCD: CPT | Performed by: INTERNAL MEDICINE

## 2024-02-29 PROCEDURE — 99214 OFFICE O/P EST MOD 30 MIN: CPT | Performed by: INTERNAL MEDICINE

## 2024-02-29 RX ORDER — METHYLPHENIDATE HYDROCHLORIDE 20 MG/1
20 TABLET ORAL 2 TIMES DAILY
Qty: 60 TABLET | Refills: 0 | Status: SHIPPED | OUTPATIENT
Start: 2024-02-29 | End: 2024-03-30

## 2024-02-29 RX ORDER — OMEPRAZOLE 20 MG/1
20 CAPSULE, DELAYED RELEASE ORAL
Qty: 30 CAPSULE | Refills: 0 | Status: SHIPPED | OUTPATIENT
Start: 2024-02-29

## 2024-02-29 RX ORDER — RIMEGEPANT SULFATE 75 MG/75MG
75 TABLET, ORALLY DISINTEGRATING ORAL
Qty: 16 TABLET | Refills: 2 | Status: SHIPPED | OUTPATIENT
Start: 2024-02-29

## 2024-02-29 SDOH — ECONOMIC STABILITY: HOUSING INSECURITY
IN THE LAST 12 MONTHS, WAS THERE A TIME WHEN YOU DID NOT HAVE A STEADY PLACE TO SLEEP OR SLEPT IN A SHELTER (INCLUDING NOW)?: NO

## 2024-02-29 SDOH — ECONOMIC STABILITY: FOOD INSECURITY: WITHIN THE PAST 12 MONTHS, THE FOOD YOU BOUGHT JUST DIDN'T LAST AND YOU DIDN'T HAVE MONEY TO GET MORE.: NEVER TRUE

## 2024-02-29 SDOH — ECONOMIC STABILITY: INCOME INSECURITY: HOW HARD IS IT FOR YOU TO PAY FOR THE VERY BASICS LIKE FOOD, HOUSING, MEDICAL CARE, AND HEATING?: NOT VERY HARD

## 2024-02-29 SDOH — ECONOMIC STABILITY: FOOD INSECURITY: WITHIN THE PAST 12 MONTHS, YOU WORRIED THAT YOUR FOOD WOULD RUN OUT BEFORE YOU GOT MONEY TO BUY MORE.: NEVER TRUE

## 2024-02-29 ASSESSMENT — PATIENT HEALTH QUESTIONNAIRE - PHQ9
2. FEELING DOWN, DEPRESSED OR HOPELESS: 0
1. LITTLE INTEREST OR PLEASURE IN DOING THINGS: 0
SUM OF ALL RESPONSES TO PHQ QUESTIONS 1-9: 0
SUM OF ALL RESPONSES TO PHQ9 QUESTIONS 1 & 2: 0
SUM OF ALL RESPONSES TO PHQ QUESTIONS 1-9: 0

## 2024-02-29 NOTE — PROGRESS NOTES
HPI:  Gladys Funes is a 69 y.o. year old female who is here for a follow-up visit.  She has had ongoing issues with dyspepsia for years.  She seems to get pain after she eats every time.  Heavier meals or with more alcohol or caffeine seem to make it worse.  She uses lots of Tums to try to help that.  No vomiting.  Some reflux symptoms with food into her throat.  No bowel movement issues.  No melena or hematochezia.  No change in bladder habits.  Her ADD is under good control with her current dose of Ritalin.  Her headaches are under control with her current meds as well.  She did find that Nurtec helped better than her Imitrex did.  She does continue to have some intermittent episodes of sinusitis followed by ENT in the past.      History reviewed. No pertinent past medical history.    Past Surgical History:   Procedure Laterality Date    DILATION AND CURETTAGE OF UTERUS      Several times for infertility    GYN      TONSILLECTOMY         Prior to Admission medications    Medication Sig Start Date End Date Taking? Authorizing Provider   Methylphenidate HCl (RITALIN PO) Take by mouth   Yes Provider, MD Briseida   Rimegepant Sulfate (NURTEC) 75 MG TBDP Take 75 mg by mouth every 48 hours 2/29/24  Yes Felton Cordero MD   methylphenidate (RITALIN) 20 MG tablet Take 1 tablet by mouth 2 times daily for 30 days. Max Daily Amount: 40 mg 2/29/24 3/30/24 Yes Felton Cordero MD   omeprazole (PRILOSEC) 20 MG delayed release capsule Take 1 capsule by mouth every morning (before breakfast) 2/29/24  Yes Felton Cordero MD   topiramate (TOPAMAX) 100 MG tablet Take 1 tablet by mouth daily 11/7/23  Yes Felton Cordero MD   SUMAtriptan (IMITREX) 100 MG tablet Take 1 tablet by mouth daily as needed for Migraine 8/1/23  Yes Felton Cordero MD   albuterol sulfate HFA (PROVENTIL;VENTOLIN;PROAIR) 108 (90 Base) MCG/ACT inhaler Inhale 2 puffs into the lungs every 4 hours as needed 7/27/22  Yes Automatic Reconciliation, Ar

## 2024-04-26 RX ORDER — TOPIRAMATE 100 MG/1
100 TABLET, FILM COATED ORAL DAILY
Qty: 90 TABLET | Refills: 0 | Status: SHIPPED | OUTPATIENT
Start: 2024-04-26

## 2024-07-24 RX ORDER — TOPIRAMATE 100 MG/1
100 TABLET, FILM COATED ORAL DAILY
Qty: 90 TABLET | Refills: 0 | Status: SHIPPED | OUTPATIENT
Start: 2024-07-24

## 2024-09-03 ENCOUNTER — TRANSCRIBE ORDERS (OUTPATIENT)
Facility: HOSPITAL | Age: 69
End: 2024-09-03

## 2024-09-03 DIAGNOSIS — Z12.31 VISIT FOR SCREENING MAMMOGRAM: Primary | ICD-10-CM

## 2024-09-11 ENCOUNTER — HOSPITAL ENCOUNTER (OUTPATIENT)
Age: 69
Discharge: HOME | End: 2024-09-14
Payer: COMMERCIAL

## 2024-09-11 VITALS — WEIGHT: 150 LBS | BODY MASS INDEX: 28.32 KG/M2 | HEIGHT: 61 IN

## 2024-09-11 DIAGNOSIS — Z12.31 VISIT FOR SCREENING MAMMOGRAM: ICD-10-CM

## 2024-09-11 PROCEDURE — 77063 BREAST TOMOSYNTHESIS BI: CPT

## 2024-10-11 RX ORDER — SUMATRIPTAN 100 MG/1
TABLET, FILM COATED ORAL
Qty: 9 TABLET | Refills: 0 | Status: SHIPPED | OUTPATIENT
Start: 2024-10-11

## 2024-10-11 NOTE — TELEPHONE ENCOUNTER
Chief Complaint   Patient presents with    Medication Refill     Last Appointment with Dr. Felton Cordero:  2/29/2024   No future appointments.    VORB

## 2024-10-22 RX ORDER — TOPIRAMATE 100 MG/1
100 TABLET, FILM COATED ORAL DAILY
Qty: 90 TABLET | Refills: 0 | Status: SHIPPED | OUTPATIENT
Start: 2024-10-22

## 2025-01-22 NOTE — TELEPHONE ENCOUNTER
Chief Complaint   Patient presents with    Medication Refill     Last Appointment with Dr. Felton Cordero:  2/29/2024   No future appointments.

## 2025-01-23 RX ORDER — TOPIRAMATE 100 MG/1
100 TABLET, FILM COATED ORAL DAILY
Qty: 90 TABLET | Refills: 0 | Status: SHIPPED | OUTPATIENT
Start: 2025-01-23

## 2025-03-03 ENCOUNTER — OFFICE VISIT (OUTPATIENT)
Age: 70
End: 2025-03-03
Payer: COMMERCIAL

## 2025-03-03 VITALS
OXYGEN SATURATION: 98 % | BODY MASS INDEX: 28.34 KG/M2 | TEMPERATURE: 97.7 F | SYSTOLIC BLOOD PRESSURE: 123 MMHG | RESPIRATION RATE: 15 BRPM | DIASTOLIC BLOOD PRESSURE: 74 MMHG | HEART RATE: 84 BPM | HEIGHT: 61 IN

## 2025-03-03 DIAGNOSIS — Z78.0 MENOPAUSE: ICD-10-CM

## 2025-03-03 DIAGNOSIS — Z00.00 ENCOUNTER FOR WELL ADULT EXAM WITHOUT ABNORMAL FINDINGS: Primary | ICD-10-CM

## 2025-03-03 DIAGNOSIS — F98.8 OTHER SPECIFIED BEHAVIORAL AND EMOTIONAL DISORDERS WITH ONSET USUALLY OCCURRING IN CHILDHOOD AND ADOLESCENCE: ICD-10-CM

## 2025-03-03 DIAGNOSIS — Z23 NEED FOR TDAP VACCINATION: ICD-10-CM

## 2025-03-03 PROCEDURE — 90471 IMMUNIZATION ADMIN: CPT | Performed by: INTERNAL MEDICINE

## 2025-03-03 PROCEDURE — 90715 TDAP VACCINE 7 YRS/> IM: CPT | Performed by: INTERNAL MEDICINE

## 2025-03-03 RX ORDER — METHYLPHENIDATE HYDROCHLORIDE 20 MG/1
20 TABLET ORAL 2 TIMES DAILY
Qty: 60 TABLET | Refills: 0 | Status: SHIPPED | OUTPATIENT
Start: 2025-03-03 | End: 2025-04-02

## 2025-03-03 SDOH — ECONOMIC STABILITY: FOOD INSECURITY: WITHIN THE PAST 12 MONTHS, THE FOOD YOU BOUGHT JUST DIDN'T LAST AND YOU DIDN'T HAVE MONEY TO GET MORE.: PATIENT DECLINED

## 2025-03-03 SDOH — ECONOMIC STABILITY: FOOD INSECURITY: WITHIN THE PAST 12 MONTHS, YOU WORRIED THAT YOUR FOOD WOULD RUN OUT BEFORE YOU GOT MONEY TO BUY MORE.: PATIENT DECLINED

## 2025-03-03 ASSESSMENT — PATIENT HEALTH QUESTIONNAIRE - PHQ9
SUM OF ALL RESPONSES TO PHQ QUESTIONS 1-9: 0
1. LITTLE INTEREST OR PLEASURE IN DOING THINGS: NOT AT ALL
SUM OF ALL RESPONSES TO PHQ QUESTIONS 1-9: 0
2. FEELING DOWN, DEPRESSED OR HOPELESS: NOT AT ALL

## 2025-03-03 NOTE — PROGRESS NOTES
Well Adult Note  Name: Gladys Funes Today’s Date: 3/3/2025   MRN: [id number] Sex: Female   Age: 70 y.o. Ethnicity: Non- / Non    : 1955 Race: White (non-)      Gladys Funes is here for a well adult exam.       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  Will continue that.  -     methylphenidate (RITALIN) 20 MG tablet; Take 1 tablet by mouth 2 times daily for 30 days. Max Daily Amount: 40 mg, Disp-60 tablet, R-0Normal  Menopause  -     DEXA BONE DENSITY AXIAL SKELETON; Future  Need for Tdap vaccination  -     Tdap, BOOSTRIX, (age 10 yrs+), IM  Migraine headaches-continue her current treatment plan.  He is using Imitrex as needed.  Allergic rhinitis-stable on current meds.      Return in 1 year (on 3/3/2026) for CPE (Physical Exam).       Subjective   History:  Presents for a wellness exam and a follow-up.  Weight is stable.  She has been active at school and at home but no organized exercise.  No headache.  No dizziness.  No nosebleeds.  No chest pains or shortness of breath.  No cough or wheeze.  No change in bowel or bladder habits.    Review of Systems  Per HPI.  Allergies   Allergen Reactions    Cephalexin Itching     Pt denies this allergy - has taken w/ no problem.       Prior to Visit Medications    Medication Sig Taking? Authorizing Provider   fluticasone (VERAMYST) 27.5 MCG/SPRAY nasal spray 2 sprays by Each Nostril route daily Yes Provider, MD Briseida   methylphenidate (RITALIN) 20 MG tablet Take 1 tablet by mouth 2 times daily for 30 days. Max Daily Amount: 40 mg Yes Felton Cordero MD   topiramate (TOPAMAX) 100 MG

## 2025-04-28 RX ORDER — TOPIRAMATE 100 MG/1
100 TABLET, FILM COATED ORAL DAILY
Qty: 90 TABLET | Refills: 0 | Status: SHIPPED | OUTPATIENT
Start: 2025-04-28

## 2025-07-31 RX ORDER — TOPIRAMATE 100 MG/1
100 TABLET, FILM COATED ORAL DAILY
Qty: 90 TABLET | Refills: 0 | Status: SHIPPED | OUTPATIENT
Start: 2025-07-31